# Patient Record
Sex: FEMALE | Race: WHITE | NOT HISPANIC OR LATINO
[De-identification: names, ages, dates, MRNs, and addresses within clinical notes are randomized per-mention and may not be internally consistent; named-entity substitution may affect disease eponyms.]

---

## 2017-09-28 ENCOUNTER — RESULT REVIEW (OUTPATIENT)
Age: 32
End: 2017-09-28

## 2018-11-19 ENCOUNTER — RESULT REVIEW (OUTPATIENT)
Age: 33
End: 2018-11-19

## 2019-03-11 ENCOUNTER — RESULT REVIEW (OUTPATIENT)
Age: 34
End: 2019-03-11

## 2020-06-09 ENCOUNTER — RESULT REVIEW (OUTPATIENT)
Age: 35
End: 2020-06-09

## 2021-06-15 ENCOUNTER — RESULT REVIEW (OUTPATIENT)
Age: 36
End: 2021-06-15

## 2022-03-04 ENCOUNTER — ASOB RESULT (OUTPATIENT)
Age: 37
End: 2022-03-04

## 2022-03-04 ENCOUNTER — APPOINTMENT (OUTPATIENT)
Dept: ANTEPARTUM | Facility: CLINIC | Age: 37
End: 2022-03-04
Payer: COMMERCIAL

## 2022-03-04 PROCEDURE — 76811 OB US DETAILED SNGL FETUS: CPT

## 2022-03-18 ENCOUNTER — TRANSCRIPTION ENCOUNTER (OUTPATIENT)
Age: 37
End: 2022-03-18

## 2022-06-19 ENCOUNTER — TRANSCRIPTION ENCOUNTER (OUTPATIENT)
Age: 37
End: 2022-06-19

## 2022-06-20 ENCOUNTER — TRANSCRIPTION ENCOUNTER (OUTPATIENT)
Age: 37
End: 2022-06-20

## 2022-06-20 ENCOUNTER — INPATIENT (INPATIENT)
Facility: HOSPITAL | Age: 37
LOS: 1 days | Discharge: HOME | End: 2022-06-22
Attending: OBSTETRICS & GYNECOLOGY | Admitting: OBSTETRICS & GYNECOLOGY
Payer: COMMERCIAL

## 2022-06-20 VITALS — OXYGEN SATURATION: 98 % | HEART RATE: 79 BPM

## 2022-06-20 DIAGNOSIS — Z34.80 ENCOUNTER FOR SUPERVISION OF OTHER NORMAL PREGNANCY, UNSPECIFIED TRIMESTER: ICD-10-CM

## 2022-06-20 DIAGNOSIS — O26.899 OTHER SPECIFIED PREGNANCY RELATED CONDITIONS, UNSPECIFIED TRIMESTER: ICD-10-CM

## 2022-06-20 DIAGNOSIS — Z3A.00 WEEKS OF GESTATION OF PREGNANCY NOT SPECIFIED: ICD-10-CM

## 2022-06-20 LAB
BASOPHILS # BLD AUTO: 0.03 K/UL — SIGNIFICANT CHANGE UP (ref 0–0.2)
BASOPHILS NFR BLD AUTO: 0.3 % — SIGNIFICANT CHANGE UP (ref 0–2)
BLD GP AB SCN SERPL QL: NEGATIVE — SIGNIFICANT CHANGE UP
COVID-19 SPIKE DOMAIN AB INTERP: NEGATIVE — SIGNIFICANT CHANGE UP
COVID-19 SPIKE DOMAIN ANTIBODY RESULT: 0.4 U/ML — SIGNIFICANT CHANGE UP
EOSINOPHIL # BLD AUTO: 0.05 K/UL — SIGNIFICANT CHANGE UP (ref 0–0.5)
EOSINOPHIL NFR BLD AUTO: 0.4 % — SIGNIFICANT CHANGE UP (ref 0–6)
HCT VFR BLD CALC: 36.8 % — SIGNIFICANT CHANGE UP (ref 34.5–45)
HGB BLD-MCNC: 12.8 G/DL — SIGNIFICANT CHANGE UP (ref 11.5–15.5)
IMM GRANULOCYTES NFR BLD AUTO: 0.4 % — SIGNIFICANT CHANGE UP (ref 0–1.5)
LYMPHOCYTES # BLD AUTO: 2.34 K/UL — SIGNIFICANT CHANGE UP (ref 1–3.3)
LYMPHOCYTES # BLD AUTO: 20.6 % — SIGNIFICANT CHANGE UP (ref 13–44)
MCHC RBC-ENTMCNC: 32 PG — SIGNIFICANT CHANGE UP (ref 27–34)
MCHC RBC-ENTMCNC: 34.8 GM/DL — SIGNIFICANT CHANGE UP (ref 32–36)
MCV RBC AUTO: 92 FL — SIGNIFICANT CHANGE UP (ref 80–100)
MONOCYTES # BLD AUTO: 0.72 K/UL — SIGNIFICANT CHANGE UP (ref 0–0.9)
MONOCYTES NFR BLD AUTO: 6.3 % — SIGNIFICANT CHANGE UP (ref 2–14)
NEUTROPHILS # BLD AUTO: 8.16 K/UL — HIGH (ref 1.8–7.4)
NEUTROPHILS NFR BLD AUTO: 72 % — SIGNIFICANT CHANGE UP (ref 43–77)
NRBC # BLD: 0 /100 WBCS — SIGNIFICANT CHANGE UP (ref 0–0)
PLATELET # BLD AUTO: 263 K/UL — SIGNIFICANT CHANGE UP (ref 150–400)
RBC # BLD: 4 M/UL — SIGNIFICANT CHANGE UP (ref 3.8–5.2)
RBC # FLD: 12.7 % — SIGNIFICANT CHANGE UP (ref 10.3–14.5)
RH IG SCN BLD-IMP: POSITIVE — SIGNIFICANT CHANGE UP
SARS-COV-2 IGG+IGM SERPL QL IA: 0.4 U/ML — SIGNIFICANT CHANGE UP
SARS-COV-2 IGG+IGM SERPL QL IA: NEGATIVE — SIGNIFICANT CHANGE UP
SARS-COV-2 RNA SPEC QL NAA+PROBE: SIGNIFICANT CHANGE UP
T PALLIDUM AB TITR SER: NEGATIVE — SIGNIFICANT CHANGE UP
WBC # BLD: 11.35 K/UL — HIGH (ref 3.8–10.5)
WBC # FLD AUTO: 11.35 K/UL — HIGH (ref 3.8–10.5)

## 2022-06-20 PROCEDURE — 88307 TISSUE EXAM BY PATHOLOGIST: CPT | Mod: 26

## 2022-06-20 RX ORDER — OXYTOCIN 10 UNIT/ML
333.33 VIAL (ML) INJECTION
Qty: 20 | Refills: 0 | Status: DISCONTINUED | OUTPATIENT
Start: 2022-06-21 | End: 2022-06-22

## 2022-06-20 RX ORDER — SODIUM CHLORIDE 9 MG/ML
1000 INJECTION, SOLUTION INTRAVENOUS
Refills: 0 | Status: DISCONTINUED | OUTPATIENT
Start: 2022-06-20 | End: 2022-06-22

## 2022-06-20 RX ORDER — LANOLIN
1 OINTMENT (GRAM) TOPICAL EVERY 6 HOURS
Refills: 0 | Status: DISCONTINUED | OUTPATIENT
Start: 2022-06-20 | End: 2022-06-22

## 2022-06-20 RX ORDER — ONDANSETRON 8 MG/1
4 TABLET, FILM COATED ORAL EVERY 6 HOURS
Refills: 0 | Status: DISCONTINUED | OUTPATIENT
Start: 2022-06-21 | End: 2022-06-22

## 2022-06-20 RX ORDER — ONDANSETRON 8 MG/1
4 TABLET, FILM COATED ORAL ONCE
Refills: 0 | Status: DISCONTINUED | OUTPATIENT
Start: 2022-06-20 | End: 2022-06-22

## 2022-06-20 RX ORDER — TETANUS TOXOID, REDUCED DIPHTHERIA TOXOID AND ACELLULAR PERTUSSIS VACCINE, ADSORBED 5; 2.5; 8; 8; 2.5 [IU]/.5ML; [IU]/.5ML; UG/.5ML; UG/.5ML; UG/.5ML
0.5 SUSPENSION INTRAMUSCULAR ONCE
Refills: 0 | Status: DISCONTINUED | OUTPATIENT
Start: 2022-06-20 | End: 2022-06-22

## 2022-06-20 RX ORDER — NALBUPHINE HYDROCHLORIDE 10 MG/ML
2.5 INJECTION, SOLUTION INTRAMUSCULAR; INTRAVENOUS; SUBCUTANEOUS EVERY 6 HOURS
Refills: 0 | Status: DISCONTINUED | OUTPATIENT
Start: 2022-06-21 | End: 2022-06-22

## 2022-06-20 RX ORDER — HEPARIN SODIUM 5000 [USP'U]/ML
5000 INJECTION INTRAVENOUS; SUBCUTANEOUS
Refills: 0 | Status: DISCONTINUED | OUTPATIENT
Start: 2022-06-20 | End: 2022-06-22

## 2022-06-20 RX ORDER — MORPHINE SULFATE 50 MG/1
2 CAPSULE, EXTENDED RELEASE ORAL ONCE
Refills: 0 | Status: DISCONTINUED | OUTPATIENT
Start: 2022-06-20 | End: 2022-06-21

## 2022-06-20 RX ORDER — DEXAMETHASONE 0.5 MG/5ML
4 ELIXIR ORAL EVERY 6 HOURS
Refills: 0 | Status: DISCONTINUED | OUTPATIENT
Start: 2022-06-21 | End: 2022-06-22

## 2022-06-20 RX ORDER — OXYCODONE HYDROCHLORIDE 5 MG/1
5 TABLET ORAL
Refills: 0 | Status: DISCONTINUED | OUTPATIENT
Start: 2022-06-21 | End: 2022-06-21

## 2022-06-20 RX ORDER — IBUPROFEN 200 MG
600 TABLET ORAL EVERY 6 HOURS
Refills: 0 | Status: COMPLETED | OUTPATIENT
Start: 2022-06-20 | End: 2023-05-19

## 2022-06-20 RX ORDER — ACETAMINOPHEN 500 MG
975 TABLET ORAL
Refills: 0 | Status: DISCONTINUED | OUTPATIENT
Start: 2022-06-20 | End: 2022-06-22

## 2022-06-20 RX ORDER — DIPHENHYDRAMINE HCL 50 MG
25 CAPSULE ORAL EVERY 6 HOURS
Refills: 0 | Status: DISCONTINUED | OUTPATIENT
Start: 2022-06-20 | End: 2022-06-22

## 2022-06-20 RX ORDER — OXYCODONE HYDROCHLORIDE 5 MG/1
5 TABLET ORAL ONCE
Refills: 0 | Status: DISCONTINUED | OUTPATIENT
Start: 2022-06-20 | End: 2022-06-22

## 2022-06-20 RX ORDER — KETOROLAC TROMETHAMINE 30 MG/ML
30 SYRINGE (ML) INJECTION EVERY 6 HOURS
Refills: 0 | Status: DISCONTINUED | OUTPATIENT
Start: 2022-06-20 | End: 2022-06-21

## 2022-06-20 RX ORDER — SODIUM CHLORIDE 9 MG/ML
1000 INJECTION, SOLUTION INTRAVENOUS
Refills: 0 | Status: DISCONTINUED | OUTPATIENT
Start: 2022-06-20 | End: 2022-06-20

## 2022-06-20 RX ORDER — OXYCODONE HYDROCHLORIDE 5 MG/1
5 TABLET ORAL
Refills: 0 | Status: DISCONTINUED | OUTPATIENT
Start: 2022-06-20 | End: 2022-06-22

## 2022-06-20 RX ORDER — CITRIC ACID/SODIUM CITRATE 300-500 MG
15 SOLUTION, ORAL ORAL EVERY 6 HOURS
Refills: 0 | Status: DISCONTINUED | OUTPATIENT
Start: 2022-06-20 | End: 2022-06-20

## 2022-06-20 RX ORDER — OXYCODONE HYDROCHLORIDE 5 MG/1
10 TABLET ORAL
Refills: 0 | Status: DISCONTINUED | OUTPATIENT
Start: 2022-06-21 | End: 2022-06-21

## 2022-06-20 RX ORDER — NALOXONE HYDROCHLORIDE 4 MG/.1ML
0.1 SPRAY NASAL
Refills: 0 | Status: DISCONTINUED | OUTPATIENT
Start: 2022-06-21 | End: 2022-06-22

## 2022-06-20 RX ORDER — SIMETHICONE 80 MG/1
80 TABLET, CHEWABLE ORAL EVERY 4 HOURS
Refills: 0 | Status: DISCONTINUED | OUTPATIENT
Start: 2022-06-20 | End: 2022-06-22

## 2022-06-20 RX ORDER — OXYTOCIN 10 UNIT/ML
VIAL (ML) INJECTION
Qty: 30 | Refills: 0 | Status: DISCONTINUED | OUTPATIENT
Start: 2022-06-20 | End: 2022-06-20

## 2022-06-20 RX ORDER — MAGNESIUM HYDROXIDE 400 MG/1
30 TABLET, CHEWABLE ORAL
Refills: 0 | Status: DISCONTINUED | OUTPATIENT
Start: 2022-06-20 | End: 2022-06-22

## 2022-06-20 RX ORDER — AMPICILLIN TRIHYDRATE 250 MG
2 CAPSULE ORAL ONCE
Refills: 0 | Status: COMPLETED | OUTPATIENT
Start: 2022-06-20 | End: 2022-06-20

## 2022-06-20 RX ORDER — AMPICILLIN TRIHYDRATE 250 MG
1 CAPSULE ORAL EVERY 4 HOURS
Refills: 0 | Status: DISCONTINUED | OUTPATIENT
Start: 2022-06-20 | End: 2022-06-20

## 2022-06-20 RX ORDER — OXYTOCIN 10 UNIT/ML
333.33 VIAL (ML) INJECTION
Qty: 20 | Refills: 0 | Status: DISCONTINUED | OUTPATIENT
Start: 2022-06-20 | End: 2022-06-22

## 2022-06-20 RX ORDER — OXYTOCIN 10 UNIT/ML
333.33 VIAL (ML) INJECTION
Qty: 20 | Refills: 0 | Status: DISCONTINUED | OUTPATIENT
Start: 2022-06-20 | End: 2022-06-20

## 2022-06-20 RX ADMIN — Medication 0.25 MILLIGRAM(S): at 10:43

## 2022-06-20 RX ADMIN — Medication 12 MILLIGRAM(S): at 13:51

## 2022-06-20 RX ADMIN — Medication 2 MILLIUNIT(S)/MIN: at 13:55

## 2022-06-20 RX ADMIN — Medication 975 MILLIGRAM(S): at 23:43

## 2022-06-20 RX ADMIN — Medication 200 GRAM(S): at 11:42

## 2022-06-20 RX ADMIN — Medication 108 GRAM(S): at 15:46

## 2022-06-20 RX ADMIN — Medication 30 MILLIGRAM(S): at 20:00

## 2022-06-20 NOTE — OB PROVIDER H&P - HISTORY OF PRESENT ILLNESS
36yo  at 36.2wk sent in from office after she had confirmed PROM in the office. Upon arrival to triage, FHR noted to be bradycardic; OB team immediately presented to bedside. Patient repositioned multiple times with no improvement. Tetanic contraction palpated and patient given terbx1 with improvement in FHR back to 140s with moderate variability and accels. Nursing staff actively placing IV during episode. Brief history obtained from patient and partner during event. Endorses +FM and LOF confirmed in office; denies cx, VB. Pregnancy uncomplicated. GBS unknown.     OBHx: Denies  GYNHx: Denies  PMH: Denies  PSH: Denies  Meds: PNV  All: NKDA   36yo  at 36.2wk sent in from office after she had confirmed PROM in the office. Upon arrival to triage, FHR noted to be bradycardic; OB team immediately presented to bedside. Patient repositioned multiple times with no improvement. Tetanic contraction palpated and patient given terbx1 with improvement in FHR back to 140s with moderate variability and accels. Nursing staff actively placing IV during episode. Brief history obtained from patient and partner during event. Endorses +FM and LOF since 7am, confirmed in office; denies cx, VB. Pregnancy uncomplicated. GBS unknown. EFW approximately 6# as of growth in office today.     OBHx: Denies  GYNHx: Denies  PMH: Denies  PSH: Denies  Meds: PNV  All: NKDA

## 2022-06-20 NOTE — OB PROVIDER LABOR PROGRESS NOTE - NS_SUBJECTIVE/OBJECTIVE_OBGYN_ALL_OB_FT
R1 Labor & Delivery Progress Note     Pt seen & examined at bedside for decel to 90 for 1 min. Pitocin at 8 miliunits in the room, paused.    T(C): 37.0 (06-20-22 @ 15:32), Max: 37.5 (06-20-22 @ 10:55)  HR: 63 (06-20-22 @ 16:15) (62 - 107)  BP: 120/66 (06-20-22 @ 16:02) (102/61 - 130/76)  RR: 16 (06-20-22 @ 15:32) (16 - 18)  SpO2: 96% (06-20-22 @ 16:15) (93% - 100%)

## 2022-06-20 NOTE — OB PROVIDER LABOR PROGRESS NOTE - NS_OBIHIFHRDETAILS_OBGYN_ALL_OB_FT
Cat 2: prolong decel to andrew of 90 now resolved w mod variability, +accel
EFM: 125/mod. variability/+accels/-decels

## 2022-06-20 NOTE — OB RN INTRAOPERATIVE NOTE - NSSELHIDDEN_OBGYN_ALL_OB_FT
[NS_DeliveryAttending1_OBGYN_ALL_OB_FT:[email or web address]=],[NS_DeliveryAssist1_OBGYN_ALL_OB_FT:[id number]==],[NS_DeliveryRN_OBGYN_ALL_OB_FT:[email or web address]=]

## 2022-06-20 NOTE — OB PROVIDER H&P - NSHPPHYSICALEXAM_GEN_ALL_CORE
Vital Signs Last 24 Hrs  T(C): --  T(F): --  HR: 93 (20 Jun 2022 10:44) (72 - 93)  BP: 114/90 (20 Jun 2022 10:37) (114/90 - 114/90)  BP(mean): --  RR: --  SpO2: 97% (20 Jun 2022 10:44) (97% - 98%)    Gen: in NAD  Abd: soft, tetanic contraction palpated  SVE: 0.5/50/-3 grossly ruptured with clear fluid  EFM: 140bpm, mod holden, +accels, +late decel (andrew 80bpm, lasting 7min)  [place]: cx q2min Vital Signs Last 24 Hrs  T(C): --  T(F): --  HR: 93 (20 Jun 2022 10:44) (72 - 93)  BP: 114/90 (20 Jun 2022 10:37) (114/90 - 114/90)  BP(mean): --  RR: --  SpO2: 97% (20 Jun 2022 10:44) (97% - 98%)    Gen: in NAD  Abd: soft, tetanic contraction palpated  SVE: 0.5/50/-3 grossly ruptured with clear fluid  EFM: 140bpm, mod holden, +accels, +late decel (andrew 80bpm, lasting 7min)  [place]: cx q2min  TAUS: Vtx

## 2022-06-20 NOTE — OB PROVIDER DELIVERY SUMMARY - NSPROVIDERDELIVERYNOTE_OBGYN_ALL_OB_FT
Pre op: cat 2 tracing remote from delivery, PPROM at 36.2  post op: same + live born male  procedure: pLTCS    APGARs 9/9  findings: normal tubes and ovaries b/l, small posterior subserosal fibroids about 2cm each, bladder flap raw, surgicell placed for additional hemostatic. routine cord blood as well as private cord bank collected.   infant and mother in good condition.

## 2022-06-20 NOTE — OB NEONATOLOGY/PEDIATRICIAN DELIVERY SUMMARY - NSPEDSNEONOTESA_OBGYN_ALL_OB_FT
Requested by Dr. Swann to attend this unscheduled [place] section born to a 37 y.o.  O+/HIV-/HepBsAg-/RI/Treponema neg/GBS ?(received ampicillin x 2)/COVID- woman at 36 2/7 wks GA. Past ObGyn hx: HPV. SROM clear AF at 0730 and was sent in from Ob office. She received BMZ x 1. Due to NRFHT, decision was made to deliver via C/S. Baby emerged cephalic with nuchal cord x 2. Baby vigorous. Delayed cord clamping x 30 seconds. Baby brought to warmer, warmed, dried, sx'd and stimulated. HR > 100 bpm with good tone and respiratory effort. Basic resuscitation provided. Apgar 9/9. EOS score = 0.28. Mother desires breastfeeding and Hep B vaccine.

## 2022-06-20 NOTE — OB PROVIDER LABOR PROGRESS NOTE - ASSESSMENT
Reviewed with patient multiple prolong decels with deep nadirs at this time. reviewed unable to induce with pitocin given prolong decels. reviewed that although the FHT recovers when the pitocin is discontinued, she remains 1cm at this time (exam by notewriter) and very remote from delivery and not in labor. Reviewed unable to induce given tracing. Reviewed that while FHT does recover, if we are unable to induce on pitocin, we are unable to deliver vaginally. At this time reasonable to proceed with CD for Cat 2 remote from delivery, inability to induce. reviewed risk including but not limited to bleeding, infection, damage to nearby organs, abnormal placentation of future pregnancies, need for additional procedures, among others. pt and partner agreeable to proceed with CD after risk and benefits discussed. all questions answered to their stated satisfaction. 
Plan: 37y y/o  @ 36w2d in stable condition  - Pitocin paused  - Continuous EFM, [place]  - Con't IVF    d/w attending physician Dr. Deon Rasmussen MD  PGY-1

## 2022-06-20 NOTE — PRE-ANESTHESIA EVALUATION ADULT - NSANTHADDINFOFT_GEN_ALL_CORE
labor epidural discussed w/ pt, in detail;  risks include but not limited to HA, failure, nv injury.  All questions answered.  D/w pt, and OB early placement due to FHR deceleration/ potential for difficult a/w mgt.

## 2022-06-20 NOTE — DISCHARGE NOTE OB - MATERIALS PROVIDED
[facility] [place] Screening Program/[place]  Immunization Record/Breastfeeding Log/Bottle Feeding Log/Breastfeeding Mother’s Support Group Information/Guide to Postpartum Care/[facility] Hearing Screen Program/Back To Sleep Handout/Shaken Baby Prevention Handout/Breastfeeding Guide and Packet/Birth Certificate Instructions/Discharge Medication Information for Patients and Families Pocket Guide

## 2022-06-20 NOTE — DISCHARGE NOTE OB - PATIENT PORTAL LINK FT
You can access the FollowMyHealth Patient Portal offered by [facility] by registering at the following website: [email or web address]. By joining [employer]’s FollowMyHealth portal, you will also be able to view your health information using other applications (apps) compatible with our system.

## 2022-06-20 NOTE — OB PROVIDER DELIVERY SUMMARY - NSSELHIDDEN_OBGYN_ALL_OB_FT
[NS_DeliveryAttending1_OBGYN_ALL_OB_FT:[email or web address]=],[NS_DeliveryAssist1_OBGYN_ALL_OB_FT:[id number]],[NS_DeliveryRN_OBGYN_ALL_OB_FT:[email or web address]=],[NS_DeliveryAssist2_OBGYN_ALL_OB_FT:[id number]=]

## 2022-06-20 NOTE — OB PROVIDER H&P - ATTENDING COMMENTS
Agree with note as above. pt presents for IOL after being found to be grossly ruptured in the office since this AM. pt 36w2d otherwise benign medical history/prenatal care. reviewed rec for IOL for PPROM now at 36wks. reviewed late  beta as well as risk and benefits. Reviewed we would not wait for BMZ complete can have benefit of first dose at this time, prior to del. pt would like to proceed with BMZ, late . reviewed IOL with pitocin as ruptured and given initial prolong deceleration. reviewed will try IOL however, CD may be needed if fetus does not tolerate induction. pt received epidural at this time, reviewed with epidural can proceed with stat CD if needed, however reassuring pt cont to contract at this time with reassuring FHT. will closely monitor at this time. pt agreeable to attempt IOL but understands she may need CD if any fetal concerns. all questions answered.

## 2022-06-20 NOTE — OB PROVIDER H&P - ASSESSMENT
36yo  at 36.2wks admitted with grossly ruptured membranes confirmed in office this morning and Cat 2 FHT; patient noted to have 7min decel upon admission 2/2 tetanic contraction requiring terbx1 with improvement in FHR. Patient counseled on need for admission for PROM as well as NRFHT; discussed possibility of  section if fetus continues to have tracing abnormalities, however will attempt IOL if possible. Patient and partner understanding and all questions answered.     Plan:  -Admit to L&D  -NPO/IVF  -Routine labs/T&S x2  -s/p Terb x1  -Will attempt IOL when tracing recovers  -Anesthesia aware, Dr. Bauer at bedside    Pt seen and evaluated with Virginie BAXTER and Dr. Castro,   Dr. Swann aware and in house  RFrankel PGY4 38yo  at 36.2wks admitted with grossly ruptured membranes confirmed in office this morning and Cat 2 FHT; patient noted to have 7min decel upon admission 2/2 tetanic contraction requiring terbx1 with improvement in FHR. Patient counseled on need for admission for PROM as well as NRFHT; discussed possibility of  section if fetus continues to have tracing abnormalities, however will attempt IOL if possible. Patient and partner understanding and all questions answered.     Plan:  -Admit to L&D  -NPO/IVF  -Routine labs/T&S x2  -Amp for GBS unk  -s/p Terb x1  -Will attempt IOL when tracing recovers  -Anesthesia aware, Dr. Bauer at bedside    Pt seen and evaluated with Virginie BAXTER and Dr. Castro,   Dr. Swann aware and in house  RFrankel PGY4 38yo  at 36.2wks admitted with grossly ruptured membranes confirmed in office this morning and Cat 2 FHT; patient noted to have 7min decel upon admission 2/2 tetanic contraction requiring terbx1 with improvement in FHR. Patient counseled on need for admission for PROM as well as NRFHT; discussed possibility of  section if fetus continues to have tracing abnormalities, however will attempt IOL if possible. Patient and partner understanding and all questions answered.     Plan:  -Admit to L&D  -NPO/IVF  -Routine labs/T&S x2  -Amp for GBS unk  -s/p Terb x1  -Will attempt IOL when tracing recovers  -Anesthesia aware, Dr. Bauer at bedside    Pt seen and evaluated with Virginie BAXTER and Dr. Castro,   Dr. Shree Logan aware and in house  RFrankel PGY4

## 2022-06-20 NOTE — OB RN DELIVERY SUMMARY - NSSELHIDDEN_OBGYN_ALL_OB_FT
[NS_DeliveryAttending1_OBGYN_ALL_OB_FT:[email or web address]=],[NS_DeliveryAssist1_OBGYN_ALL_OB_FT:[id number]],[NS_DeliveryRN_OBGYN_ALL_OB_FT:[email or web address]=]

## 2022-06-20 NOTE — DISCHARGE NOTE OB - HOSPITAL COURSE
Patient underwent a  delivery. By POD2 the patient was ambulating, tolerating a regular diet, voiding with out issue, passing flatus with pain well controlled. She was discharged home with out patient follow up.    Patient underwent a  delivery. By POD2 the patient was ambulating, tolerating a regular diet, voiding with out issue, passing flatus with pain well controlled. She was discharged home with out patient follow up at pod#2

## 2022-06-20 NOTE — DISCHARGE NOTE OB - NS MD DC FALL RISK RISK
For information on Fall & Injury Prevention, visit: [email or web address] OR  [email or web address] OR  https://www.cdc.gov/steadi/patient.html

## 2022-06-20 NOTE — DISCHARGE NOTE OB - MEDICATION SUMMARY - MEDICATIONS TO TAKE
I will START or STAY ON the medications listed below when I get home from the hospital:    acetaminophen 325 mg oral tablet  -- 2 tab(s) by mouth every 4 to 6 hours  -- Indication: For  delivery delivered    ibuprofen 600 mg oral tablet  -- 1 tab(s) by mouth every 6 hours  -- Indication: For  delivery delivered    oxyCODONE 5 mg oral tablet  -- 1 tab(s) by mouth every 4 to 6 hours, As Needed -10)  -- Indication: For  delivery delivered

## 2022-06-20 NOTE — OB RN PATIENT PROFILE - FALL HARM RISK - UNIVERSAL INTERVENTIONS
Bed in lowest position, wheels locked, appropriate side rails in place/Call bell, personal items and telephone in reach/Instruct patient to call for assistance before getting out of bed or chair/Non-slip footwear when patient is out of bed/Ada to call system/Physically safe environment - no spills, clutter or unnecessary equipment/Purposeful Proactive Rounding/Room/bathroom lighting operational, light cord in reach

## 2022-06-20 NOTE — DISCHARGE NOTE OB - CARE PLAN
1 Principal Discharge DX:	 delivery delivered  Assessment and plan of treatment:	Nothing in the vagina and no heavy lifting for 6 weeks.

## 2022-06-20 NOTE — OB RN DELIVERY SUMMARY - NS_SEPSISRSKCALC_OBGYN_ALL_OB_FT
No temperature has been documented for this patient in CPN or on the OB Flowsheet. Ensure the highest temperature during labor was documented on the OB Flowsheet.  No gestational age at birth has been documented. Ensure delivery date/time has been entered above.  Rupture of membranes must be entered above.   EOS calculated successfully. EOS Risk Factor: 0.5/1000 live births ([facility] national incidence); GA=36w2d; Temp=99.5; ROM=10.717; GBS='Unknown'; Antibiotics='GBS specific antibiotics > 2 hrs prior to birth'

## 2022-06-21 LAB
HCT VFR BLD CALC: 30.9 % — LOW (ref 34.5–45)
HGB BLD-MCNC: 10.3 G/DL — LOW (ref 11.5–15.5)
MCHC RBC-ENTMCNC: 31.7 PG — SIGNIFICANT CHANGE UP (ref 27–34)
MCHC RBC-ENTMCNC: 33.3 GM/DL — SIGNIFICANT CHANGE UP (ref 32–36)
MCV RBC AUTO: 95.1 FL — SIGNIFICANT CHANGE UP (ref 80–100)
NRBC # BLD: 0 /100 WBCS — SIGNIFICANT CHANGE UP (ref 0–0)
PLATELET # BLD AUTO: 218 K/UL — SIGNIFICANT CHANGE UP (ref 150–400)
RBC # BLD: 3.25 M/UL — LOW (ref 3.8–5.2)
RBC # FLD: 12.8 % — SIGNIFICANT CHANGE UP (ref 10.3–14.5)
WBC # BLD: 18.49 K/UL — HIGH (ref 3.8–10.5)
WBC # FLD AUTO: 18.49 K/UL — HIGH (ref 3.8–10.5)

## 2022-06-21 RX ORDER — IBUPROFEN 200 MG
600 TABLET ORAL EVERY 6 HOURS
Refills: 0 | Status: DISCONTINUED | OUTPATIENT
Start: 2022-06-21 | End: 2022-06-22

## 2022-06-21 RX ADMIN — NALBUPHINE HYDROCHLORIDE 2.5 MILLIGRAM(S): 10 INJECTION, SOLUTION INTRAMUSCULAR; INTRAVENOUS; SUBCUTANEOUS at 08:31

## 2022-06-21 RX ADMIN — Medication 975 MILLIGRAM(S): at 18:38

## 2022-06-21 RX ADMIN — Medication 30 MILLIGRAM(S): at 03:06

## 2022-06-21 RX ADMIN — Medication 30 MILLIGRAM(S): at 14:22

## 2022-06-21 RX ADMIN — Medication 975 MILLIGRAM(S): at 00:12

## 2022-06-21 RX ADMIN — Medication 600 MILLIGRAM(S): at 22:34

## 2022-06-21 RX ADMIN — HEPARIN SODIUM 5000 UNIT(S): 5000 INJECTION INTRAVENOUS; SUBCUTANEOUS at 05:39

## 2022-06-21 RX ADMIN — Medication 30 MILLIGRAM(S): at 09:10

## 2022-06-21 RX ADMIN — Medication 600 MILLIGRAM(S): at 23:15

## 2022-06-21 RX ADMIN — Medication 30 MILLIGRAM(S): at 13:50

## 2022-06-21 RX ADMIN — Medication 975 MILLIGRAM(S): at 18:13

## 2022-06-21 RX ADMIN — Medication 975 MILLIGRAM(S): at 23:37

## 2022-06-21 RX ADMIN — HEPARIN SODIUM 5000 UNIT(S): 5000 INJECTION INTRAVENOUS; SUBCUTANEOUS at 18:14

## 2022-06-21 RX ADMIN — Medication 30 MILLIGRAM(S): at 02:36

## 2022-06-21 RX ADMIN — Medication 30 MILLIGRAM(S): at 08:40

## 2022-06-21 NOTE — PROGRESS NOTE ADULT - NS ATTEND AMEND GEN_ALL_CORE FT
POD1 s/p 1'  section, doing well    Patient seen and examined, agree with ab krishna.       Vital Signs Last 24 Hrs  T(C): 37 (2022 09:24), Max: 37.0 (2022 15:32)  T(F): 98.6 (2022 09:24), Max: 98.6 (2022 15:32)  HR: 71 (2022 09:24) (60 - 89)  BP: 96/60 (2022 09:24) (96/60 - 130/76)  BP(mean): 81 (2022 22:15) (74 - 85)  RR: 18 (2022 09:24) (16 - 25)  SpO2: 98% (2022 09:24) (93% - 99%)    NAD  INCISION C/D/I   MIN LOCHIA  EXT NONTENDER                          12.8   11.35 )-----------( 263      ( 2022 11:36 )             36.8       PLAN:   -Routine postoperative care   -H/H appropriate   -Reg diet   -ZEKE Cleaning MD

## 2022-06-22 VITALS
SYSTOLIC BLOOD PRESSURE: 110 MMHG | HEART RATE: 71 BPM | RESPIRATION RATE: 18 BRPM | DIASTOLIC BLOOD PRESSURE: 73 MMHG | OXYGEN SATURATION: 96 % | TEMPERATURE: 98 F

## 2022-06-22 RX ORDER — ACETAMINOPHEN 500 MG
2 TABLET ORAL
Qty: 0 | Refills: 0 | DISCHARGE
Start: 2022-06-22

## 2022-06-22 RX ORDER — IBUPROFEN 200 MG
1 TABLET ORAL
Qty: 0 | Refills: 0 | DISCHARGE
Start: 2022-06-22

## 2022-06-22 RX ORDER — OXYCODONE HYDROCHLORIDE 5 MG/1
1 TABLET ORAL
Qty: 0 | Refills: 0 | DISCHARGE
Start: 2022-06-22

## 2022-06-22 RX ADMIN — Medication 600 MILLIGRAM(S): at 11:10

## 2022-06-22 RX ADMIN — Medication 975 MILLIGRAM(S): at 12:41

## 2022-06-22 RX ADMIN — Medication 975 MILLIGRAM(S): at 13:13

## 2022-06-22 RX ADMIN — Medication 975 MILLIGRAM(S): at 06:15

## 2022-06-22 RX ADMIN — Medication 600 MILLIGRAM(S): at 04:31

## 2022-06-22 RX ADMIN — HEPARIN SODIUM 5000 UNIT(S): 5000 INJECTION INTRAVENOUS; SUBCUTANEOUS at 06:07

## 2022-06-22 RX ADMIN — Medication 975 MILLIGRAM(S): at 06:06

## 2022-06-22 RX ADMIN — Medication 600 MILLIGRAM(S): at 10:39

## 2022-06-22 RX ADMIN — Medication 600 MILLIGRAM(S): at 05:05

## 2022-06-22 RX ADMIN — Medication 975 MILLIGRAM(S): at 00:15

## 2022-06-22 NOTE — PROGRESS NOTE ADULT - ASSESSMENT
A/P:  37y  POD # 2 S/P  section, doing well    
A/P:  37y    S/P primary   section   POD # 1, doing well    PAST MEDICAL & SURGICAL HISTORY:  No pertinent past medical history    No significant past surgical history    Current Issues: None

## 2022-06-22 NOTE — PROGRESS NOTE ADULT - PROBLEM SELECTOR PLAN 1
Increase OOB  DVT ppx  Dressing removed  Due to void  Regular diet  AM CBC  Routine Postpartum/Post-op care    Luciana Collazo FNP-BC
Increase OOB  PO Pain Protocol  Continue Regular Diet  Continue routine prenatal care

## 2022-06-22 NOTE — PROGRESS NOTE ADULT - SUBJECTIVE AND OBJECTIVE BOX
Day 1 of Anesthesia Pain Management Service    SUBJECTIVE:  Pain Scale Score:          [X] Refer to charted pain scores    THERAPY: Received PF spinal morphine as above    OBJECTIVE:    Sedation:        	[X] Alert	[ ] Drowsy	[ ] Arousable      [ ] Asleep       [ ] Unresponsive    Side Effects:	[X] None	[ ] Nausea	[ ] Vomiting         [ ] Pruritus  		[ ] Weakness            [ ] Numbness	          [ ] Other:    ASSESSMENT/ PLAN  [X] Patient transitioned to prn analgesics  [X] Pain management per primary service, pain service to sign off   [X]Documentation and Verification of current medications
Day 1 of Anesthesia Pain Management Service    SUBJECTIVE: Doing ok  Pain Scale Score:          [X] Refer to charted pain scores    THERAPY:  s/p   2  mg PF epidural morphine on 6\20\22       MEDICATIONS  (STANDING):  acetaminophen     Tablet 975 milliGRAM(s) Oral <User Schedule>  diphtheria/tetanus/pertussis (acellular) Vaccine (ADAcel) 0.5 milliLiter(s) IntraMuscular once  heparin   Injectable 5000 Unit(s) SubCutaneous two times a day  ibuprofen  Tablet. 600 milliGRAM(s) Oral every 6 hours  ketorolac   Injectable 30 milliGRAM(s) IV Push every 6 hours  lactated ringers. 1000 milliLiter(s) (125 mL/Hr) IV Continuous <Continuous>  morphine PF Epidural 2 milliGRAM(s) Epidural once  ondansetron Injectable 4 milliGRAM(s) IV Push once  oxytocin Infusion 333.333 milliUNIT(s)/Min (1000 mL/Hr) IV Continuous <Continuous>  oxytocin Infusion 333.333 milliUNIT(s)/Min (1000 mL/Hr) IV Continuous <Continuous>    MEDICATIONS  (PRN):  dexAMETHasone  Injectable 4 milliGRAM(s) IV Push every 6 hours PRN Nausea  diphenhydrAMINE 25 milliGRAM(s) Oral every 6 hours PRN Pruritus  lanolin Ointment 1 Application(s) Topical every 6 hours PRN Sore Nipples  magnesium hydroxide Suspension 30 milliLiter(s) Oral two times a day PRN Constipation  nalbuphine Injectable 2.5 milliGRAM(s) IV Push every 6 hours PRN Pruritus  naloxone Injectable 0.1 milliGRAM(s) IV Push every 3 minutes PRN For ANY of the following changes in patient status:  A. Breaths Per Minute LESS THAN 10, B. Oxygen saturation LESS THAN 90%, C. Sedation score of 6 for Stop After: 4 Times  ondansetron Injectable 4 milliGRAM(s) IV Push every 6 hours PRN Nausea  oxyCODONE    IR 5 milliGRAM(s) Oral every 3 hours PRN Moderate to Severe Pain (4-10)  oxyCODONE    IR 5 milliGRAM(s) Oral once PRN Moderate to Severe Pain (4-10)  oxyCODONE    IR 5 milliGRAM(s) Oral every 3 hours PRN Mild Pain (1 - 3)  oxyCODONE    IR 10 milliGRAM(s) Oral every 3 hours PRN Moderate Pain (4 - 6)  simethicone 80 milliGRAM(s) Chew every 4 hours PRN Gas      OBJECTIVE:    Sedation:        	[X] Alert	 [ ] Drowsy	[ ] Arousable      [ ] Asleep       [ ] Unresponsive    Side Effects:	[ ] None 	[ ] Nausea	[ ] Vomiting         [X ] Pruritus: Nubain prn  		[ ] Weakness            [ ] Numbness	          [ ] Other:     Vital Signs Last 24 Hrs  T(C): 36.8 (21 Jun 2022 05:16), Max: 37.5 (20 Jun 2022 10:55)  T(F): 98.3 (21 Jun 2022 05:16), Max: 99.5 (20 Jun 2022 10:55)  HR: 63 (21 Jun 2022 05:16) (60 - 107)  BP: 99/57 (21 Jun 2022 05:16) (99/57 - 130/76)  BP(mean): 81 (20 Jun 2022 22:15) (74 - 85)  RR: 18 (21 Jun 2022 05:39) (16 - 25)  SpO2: 96% (21 Jun 2022 05:16) (93% - 100%)    ASSESSMENT/ PLAN  [X] Patient to be transitioned to prn analgesics after 24 hours  [X] Pain management per primary service, pain service to sign off   [X]Documentation and Verification of current medications
OB Attending Note      S: Pt feeling well, +F, pain controlled    Physical exam:    Vital Signs Last 24 Hrs  T(C): 36.6 (2022 06:23), Max: 36.9 (2022 13:13)  T(F): 97.8 (2022 06:23), Max: 98.4 (2022 13:13)  HR: 72 (2022 06:23) (70 - 81)  BP: 118/78 (2022 06:23) (95/58 - 118/78)        Gen: NAD  Breast: Soft, nontender, not engorged.  Abdomen: Soft, nontender, no distension , firm uterine fundus at umbilicus.  Incision: Clean, dry, and intact with steri strips  Scant Lochia  Ext: No calf tenderness    LABS:                        10.3   18.49 )-----------( 218      ( 2022 15:00 )             30.9                         12.8   11.35 )-----------( 263      ( 2022 11:36 )             36.8                         Assessment and Plan  POD #2 s/p  section  Doing well.  Discharge instructions reviewed, all questions answered, Pain meds with NSAIDs/narcotics reviewed, ambulation/ nothing per vagina, no lifting or exercise, f/u 2 weeks for a post op check            
Postpartum Note-  Section POD#2    Allergies: No Known Allergies    Subjective: Patient w/o complaints, pain is controlled.  Pt is OOB, tolerating PO, passing flatus, and voiding. Lochia WNL.     O:  Vital Signs Last 24 Hrs  T(C): 36.6 (2022 06:23), Max: 37 (2022 09:24)  T(F): 97.8 (2022 06:23), Max: 98.6 (2022 09:24)  HR: 72 (2022 06:23) (70 - 81)  BP: 118/78 (2022 06:23) (95/58 - 118/78)  BP(mean): --  RR: 16 (2022 06:23) (16 - 18)  SpO2: 97% (2022 06:23) (97% - 98%)      Gen: NAD  Heart: S1S2 RRR  Lungs: CTA b/l  Abdomen: Soft, nontender, non distended, fundus firm.  Incision: Clean, dry, and intact.  Negative erythema/edema/ecchymosis.   SubQ  Lochia WNL  Ext: Neg edema, Neg calf tenderness    LABS:               10.3   18.49 )-----------( 218      ( 21 @ 15:00 )             30.9                12.8   11.35 )-----------( 263      ( 20 @ 11:36 )             36.8           PAST MEDICAL & SURGICAL HISTORY:  No pertinent past medical history      No significant past surgical history        Current Issues: none            
 Postpartum Note-  Section POD#1    Allergies : No Known Allergies    Blood Type O Positive     RPR Negative    Rubella Immune    S: Patient is a  37y  POD#1 S/P  primary   Patient w/o complaints, pain is controlled.  Pt is OOB, tolerating PO, passing flatus. Lochia WNL. Due to void    Feeding: Breast/Bottle    O:  Vital Signs Last 24 Hrs  T(C): 36.8 (2022 05:16), Max: 37.5 (2022 10:55)  T(F): 98.3 (2022 05:16), Max: 99.5 (2022 10:55)  HR: 63 (2022 05:16) (60 - 107)  BP: 99/57 (2022 05:16) (99/57 - 130/76)  BP(mean): 81 (2022 22:15) (74 - 85)  RR: 18 (2022 05:39) (16 - 25)  SpO2: 96% (2022 05:16) (93% - 100%)  I&O's Summary    2022 07:01  -  2022 07:00  --------------------------------------------------------  IN: 2070 mL / OUT: 4415 mL / NET: -2345 mL        Gen: NAD  Abdomen: Soft, nontender, non-distended, fundus firm.  Incision: Clean, dry, and intact.  Negative erythema/edema/ecchymosis   Sub Q  Lochia WNL  Ext: SCDs in place. Negative Edema    LABS:                          12.8   11.35 )-----------( 263      ( 2022 11:36 )             36.8

## 2022-07-23 LAB — SURGICAL PATHOLOGY STUDY: SIGNIFICANT CHANGE UP

## 2022-08-01 ENCOUNTER — RESULT REVIEW (OUTPATIENT)
Age: 37
End: 2022-08-01

## 2022-09-01 PROBLEM — Z78.9 OTHER SPECIFIED HEALTH STATUS: Chronic | Status: ACTIVE | Noted: 2022-06-20

## 2022-09-08 ENCOUNTER — OUTPATIENT (OUTPATIENT)
Dept: OUTPATIENT SERVICES | Facility: HOSPITAL | Age: 37
LOS: 1 days | End: 2022-09-08
Payer: COMMERCIAL

## 2022-09-08 ENCOUNTER — APPOINTMENT (OUTPATIENT)
Dept: MAMMOGRAPHY | Facility: CLINIC | Age: 37
End: 2022-09-08

## 2022-09-08 ENCOUNTER — APPOINTMENT (OUTPATIENT)
Dept: ULTRASOUND IMAGING | Facility: CLINIC | Age: 37
End: 2022-09-08

## 2022-09-08 DIAGNOSIS — Z00.8 ENCOUNTER FOR OTHER GENERAL EXAMINATION: ICD-10-CM

## 2022-09-08 PROCEDURE — 76641 ULTRASOUND BREAST COMPLETE: CPT

## 2022-09-08 PROCEDURE — 77066 DX MAMMO INCL CAD BI: CPT | Mod: 26

## 2022-09-08 PROCEDURE — G0279: CPT

## 2022-09-08 PROCEDURE — G0279: CPT | Mod: 26

## 2022-09-08 PROCEDURE — 76641 ULTRASOUND BREAST COMPLETE: CPT | Mod: 26,50

## 2022-09-08 PROCEDURE — 77066 DX MAMMO INCL CAD BI: CPT

## 2023-04-11 NOTE — PRE-ANESTHESIA EVALUATION ADULT - NSPREOPDXFT_GEN_ALL_CORE
36 yo F  @ 36 wks, SROM, presented w/ fetal deceleration General Sunscreen Counseling: I recommended a broad spectrum sunscreen with a SPF of 30 or higher.  I explained that SPF 30 sunscreens block approximately 97 percent of the sun's harmful rays.  Sunscreens should be applied at least 15 minutes prior to expected sun exposure and then every 2 hours after that as long as sun exposure continues. If swimming or exercising sunscreen should be reapplied every 45 minutes to an hour after getting wet or sweating.  One ounce, or the equivalent of a shot glass full of sunscreen, is adequate to protect the skin not covered by a bathing suit. I also recommended a lip balm with a sunscreen as well. Sun protective clothing can be used in lieu of sunscreen but must be worn the entire time you are exposed to the sun's rays. Detail Level: Detailed

## 2023-05-10 NOTE — OB RN PATIENT PROFILE - NS TRANSFER DISPOSITION PATIENT BELONGINGS
Want to Say “Thank You” to a Nurse?  The PB Award® was created in memory of LICHA Rodriguez by his family to say thank you to bedside nurses who provide an outstanding level of care.  Submit a nomination using any method below.     OR    [email or web address]      
with patient

## 2023-09-11 ENCOUNTER — APPOINTMENT (OUTPATIENT)
Dept: MAMMOGRAPHY | Facility: CLINIC | Age: 38
End: 2023-09-11
Payer: COMMERCIAL

## 2023-09-11 ENCOUNTER — APPOINTMENT (OUTPATIENT)
Dept: ULTRASOUND IMAGING | Facility: CLINIC | Age: 38
End: 2023-09-11
Payer: COMMERCIAL

## 2023-09-11 ENCOUNTER — OUTPATIENT (OUTPATIENT)
Dept: OUTPATIENT SERVICES | Facility: HOSPITAL | Age: 38
LOS: 1 days | End: 2023-09-11
Payer: COMMERCIAL

## 2023-09-11 DIAGNOSIS — N60.19 DIFFUSE CYSTIC MASTOPATHY OF UNSPECIFIED BREAST: ICD-10-CM

## 2023-09-11 PROCEDURE — 77066 DX MAMMO INCL CAD BI: CPT | Mod: 26

## 2023-09-11 PROCEDURE — G0279: CPT | Mod: 26

## 2023-09-11 PROCEDURE — 76641 ULTRASOUND BREAST COMPLETE: CPT | Mod: 26,50

## 2023-09-11 PROCEDURE — G0279: CPT

## 2023-09-11 PROCEDURE — 77066 DX MAMMO INCL CAD BI: CPT

## 2023-09-11 PROCEDURE — 76641 ULTRASOUND BREAST COMPLETE: CPT

## 2024-01-02 ENCOUNTER — NON-APPOINTMENT (OUTPATIENT)
Age: 39
End: 2024-01-02

## 2024-01-11 ENCOUNTER — APPOINTMENT (OUTPATIENT)
Dept: ORTHOPEDIC SURGERY | Facility: CLINIC | Age: 39
End: 2024-01-11
Payer: COMMERCIAL

## 2024-01-11 VITALS — HEIGHT: 62 IN | WEIGHT: 145 LBS | BODY MASS INDEX: 26.68 KG/M2

## 2024-01-11 DIAGNOSIS — S93.491D SPRAIN OF OTHER LIGAMENT OF RIGHT ANKLE, SUBSEQUENT ENCOUNTER: ICD-10-CM

## 2024-01-11 PROCEDURE — 73610 X-RAY EXAM OF ANKLE: CPT | Mod: RT

## 2024-01-11 PROCEDURE — 99203 OFFICE O/P NEW LOW 30 MIN: CPT

## 2024-01-16 PROBLEM — S93.491D SPRAIN OF ANTERIOR TALOFIBULAR LIGAMENT OF RIGHT ANKLE, SUBSEQUENT ENCOUNTER: Status: ACTIVE | Noted: 2024-01-16

## 2024-01-16 NOTE — PHYSICAL EXAM
[de-identified] : Right Ankle exam:   Skin: Clean, dry, intact Inspection: No obvious malalignment, + swelling, no effusion, resolving ecchymosis.  Pulses: 2+ DP/PT pulses ROM: 10 degrees of dorsiflexion, 20 degrees of plantarflexion, normal subtalar motion. Tenderness: No tenderness over the lateral malleolus, no CFL+ ATFL no PTFL pain. No medial malleolus pain, no deltoid ligament pain. No proximal fibular pain. No heel pain. Stability: Negative anterior drawer, negative posterior drawer. Strength: 5/5 TA/GS/EHL 5/5 inversion/eversion Neuro: In tact to light touch throughout Additional tests: Negative syndesmosis squeeze test. Other findings: None. [de-identified] : The following radiographs were ordered and read by me during this patients visit. I reviewed each radiograph in detail with the patient and discussed the findings as highlighted below.   3 views of the right ankle were obtained today, 01/11/2024, that show no acute fracture or dislocation. There is no significant degenerative change seen. There is no gross malalignment. Ankle mortise is intact. No significant other obvious acute osseous abnormality, otherwise unremarkable.

## 2024-01-16 NOTE — HISTORY OF PRESENT ILLNESS
[de-identified] : 38 year old female presents today with right ankle pain s/p injury 12/29/23. Patient inverted her ankle carrying her child down stairs. Patients ankle got swollen and was evaluated at [facility], x-rays were done which was negative for fx. Patient presents in short CAM boot. The pain has improved, ankle is tender to the touch. States that she does not feel confident in the ankle. She is not taking pain medication. Patient has had inversion injuries before in this ankle but this time was worse.   The patient's past medical history, past surgical history, medications and allergies were reviewed by me today with the patient and documented accordingly. In addition, the patient's family and social history, which were noncontributory to this visit, were reviewed also.

## 2024-01-16 NOTE — DISCUSSION/SUMMARY
[de-identified] : 39 y/o female with right ankle sprain.   The patient presents with an acute inversion injury to the right ankle with an injury to the lateral ligamentous complex. I outlined a treatment protocol that will require a period of activity modification and relative rest. Further nonoperative modalities of treatment include relative rest from impact loading exercise, NSAID's/tylenol prn, cold compress for swelling control, compressive wraps/bracing, gradual return to weight bearing and load bearing exercise with or without the guidance of physical therapy for balance/proprioceptive/strength training.  In addition, I discussed the spectrum of sprain injuries and short and long term outcomes. The majority of sprain respond well to nonoperative modalities of treatment, and the indication for surgical management is typically reserved for ankle joints that become chronically and grossly unstable.  Recommendation: Ice/Tylenol/NSAIDs p.r.n.. Begin trial of PT, Rx given.   Follow up as needed.

## 2024-01-16 NOTE — ADDENDUM
[FreeTextEntry1] : This note was written by Althea Wallace on 01/11/2024 acting solely as a scribe for Dr. Jd Soto.  All medical record entries made by the Scribe were at my, Dr. Jd Soto, direction and personally dictated by me on 01/11/2024. I have personally reviewed the chart and agree that the record accurately reflects my personal performance of the history, physical exam, assessment and plan.

## 2024-10-09 ENCOUNTER — APPOINTMENT (OUTPATIENT)
Dept: MAMMOGRAPHY | Facility: CLINIC | Age: 39
End: 2024-10-09
Payer: COMMERCIAL

## 2024-10-09 ENCOUNTER — OUTPATIENT (OUTPATIENT)
Dept: OUTPATIENT SERVICES | Facility: HOSPITAL | Age: 39
LOS: 1 days | End: 2024-10-09
Payer: COMMERCIAL

## 2024-10-09 ENCOUNTER — APPOINTMENT (OUTPATIENT)
Dept: ULTRASOUND IMAGING | Facility: CLINIC | Age: 39
End: 2024-10-09
Payer: COMMERCIAL

## 2024-10-09 DIAGNOSIS — Z00.8 ENCOUNTER FOR OTHER GENERAL EXAMINATION: ICD-10-CM

## 2024-10-09 PROCEDURE — 77063 BREAST TOMOSYNTHESIS BI: CPT

## 2024-10-09 PROCEDURE — 77067 SCR MAMMO BI INCL CAD: CPT | Mod: 26

## 2024-10-09 PROCEDURE — 77063 BREAST TOMOSYNTHESIS BI: CPT | Mod: 26

## 2024-10-09 PROCEDURE — 76641 ULTRASOUND BREAST COMPLETE: CPT | Mod: 26,50

## 2024-10-09 PROCEDURE — 76641 ULTRASOUND BREAST COMPLETE: CPT

## 2024-10-09 PROCEDURE — 77067 SCR MAMMO BI INCL CAD: CPT
